# Patient Record
Sex: MALE | Race: WHITE | NOT HISPANIC OR LATINO | Employment: UNEMPLOYED | URBAN - METROPOLITAN AREA
[De-identification: names, ages, dates, MRNs, and addresses within clinical notes are randomized per-mention and may not be internally consistent; named-entity substitution may affect disease eponyms.]

---

## 2019-10-24 ENCOUNTER — OFFICE VISIT (OUTPATIENT)
Dept: FAMILY MEDICINE | Facility: CLINIC | Age: 64
End: 2019-10-24

## 2019-10-24 VITALS
WEIGHT: 168 LBS | HEIGHT: 69 IN | BODY MASS INDEX: 24.88 KG/M2 | OXYGEN SATURATION: 98 % | HEART RATE: 59 BPM | DIASTOLIC BLOOD PRESSURE: 80 MMHG | SYSTOLIC BLOOD PRESSURE: 150 MMHG

## 2019-10-24 DIAGNOSIS — R41.3 MEMORY CHANGES: ICD-10-CM

## 2019-10-24 DIAGNOSIS — E78.5 HYPERLIPIDEMIA, UNSPECIFIED HYPERLIPIDEMIA TYPE: ICD-10-CM

## 2019-10-24 DIAGNOSIS — F17.200 SMOKING: ICD-10-CM

## 2019-10-24 DIAGNOSIS — I10 HYPERTENSION, UNSPECIFIED TYPE: ICD-10-CM

## 2019-10-24 DIAGNOSIS — Z76.89 ENCOUNTER TO ESTABLISH CARE: Primary | ICD-10-CM

## 2019-10-24 DIAGNOSIS — K21.9 GASTROESOPHAGEAL REFLUX DISEASE, ESOPHAGITIS PRESENCE NOT SPECIFIED: ICD-10-CM

## 2019-10-24 DIAGNOSIS — I63.9 ISCHEMIC STROKE: ICD-10-CM

## 2019-10-24 DIAGNOSIS — R53.1 RIGHT SIDED WEAKNESS: ICD-10-CM

## 2019-10-24 PROCEDURE — 99205 OFFICE O/P NEW HI 60 MIN: CPT | Mod: S$GLB,,, | Performed by: NURSE PRACTITIONER

## 2019-10-24 PROCEDURE — 99205 PR OFFICE/OUTPT VISIT, NEW, LEVL V, 60-74 MIN: ICD-10-PCS | Mod: S$GLB,,, | Performed by: NURSE PRACTITIONER

## 2019-10-24 RX ORDER — ATORVASTATIN CALCIUM 80 MG/1
80 TABLET, FILM COATED ORAL NIGHTLY
Qty: 30 TABLET | Refills: 2 | Status: SHIPPED | OUTPATIENT
Start: 2019-10-24 | End: 2020-01-23 | Stop reason: SDUPTHER

## 2019-10-24 RX ORDER — CLOPIDOGREL BISULFATE 75 MG/1
75 TABLET ORAL DAILY
COMMUNITY
End: 2019-10-24 | Stop reason: SDUPTHER

## 2019-10-24 RX ORDER — ATORVASTATIN CALCIUM 80 MG/1
80 TABLET, FILM COATED ORAL DAILY
COMMUNITY
End: 2019-10-24 | Stop reason: SDUPTHER

## 2019-10-24 RX ORDER — OMEPRAZOLE 20 MG/1
20 CAPSULE, DELAYED RELEASE ORAL DAILY
COMMUNITY
End: 2020-03-06

## 2019-10-24 RX ORDER — MELOXICAM 15 MG/1
15 TABLET ORAL DAILY
COMMUNITY
End: 2020-03-06 | Stop reason: SDUPTHER

## 2019-10-24 RX ORDER — ASPIRIN 81 MG/1
81 TABLET ORAL DAILY
COMMUNITY

## 2019-10-24 RX ORDER — VALSARTAN 40 MG/1
40 TABLET ORAL DAILY
Qty: 30 TABLET | Refills: 2 | Status: SHIPPED | OUTPATIENT
Start: 2019-10-24 | End: 2020-01-23 | Stop reason: SDUPTHER

## 2019-10-24 RX ORDER — CLOPIDOGREL BISULFATE 75 MG/1
75 TABLET ORAL DAILY
Qty: 30 TABLET | Refills: 2 | Status: SHIPPED | OUTPATIENT
Start: 2019-10-24 | End: 2020-01-23 | Stop reason: SDUPTHER

## 2019-10-24 NOTE — PROGRESS NOTES
Clinic Note  10/24/2019      Subjective:       Patient ID:  Jose is a 64 y.o. male being seen in office today to establish care.       Chief Complaint: Establish Care and Hypertension (Pt went to see a Dr for his disability and his BP at the visit was 198/102-on last Saturday. Kept a headache all weekend and it went away on this Tues. Pt's wife monitored BP over the weekend and she recorded high readings. )    New patient here to establish care with wife at side. Wife is the primary historian during the visit. Reports had ischemic stroke in June of this year while working in [place]. Has since been unable to work and lost insurance. Currently trying to get disability until Medicare begins in January of 2020. Out of Plavix and Lipitor prescribed during hospital admission for ischemic CVA. Also reports elevated B/P at home of 198/102 with headaches. Elevated in office today as well. Denies ever taking any previous B/P medication. Wife reports right sided deficits after CVA. Cannot hold utensil in right hand and has trouble maintaining a steady balance when walking due to right leg weakness. Denies difficulty swallowing but wife states she has been cutting up his food for him. Wife also states his memory is not as sharp and he forgets what he is doing at times since the CVA.     Current smoker: Continues to smoke. Had taken Chantix in the past and was successful with cessation but can no longer afford the RX since losing insurance coverage. Used OTC patches in the past with no success.     GERD:  well controlled on OTC Omeprazole.     Prevention: Reports colonoscopy about 5 years ago; denies any problems at the time. Would like to hold off on all preventive vaccines and testing until insurance coverage is established.     Family History   Problem Relation Age of Onset    Diabetes Mother     Hypertension Mother     Cancer Father     Aneurysm Father     Diabetes Brother      Social History     Socioeconomic History     Marital status:      Spouse name: Not on file    Number of children: Not on file    Years of education: Not on file    Highest education level: Not on file   Occupational History    Not on file   Social Needs    Financial resource strain: Not on file    Food insecurity:     Worry: Not on file     Inability: Not on file    Transportation needs:     Medical: Not on file     Non-medical: Not on file   Tobacco Use    Smoking status: Current Every Day Smoker    Smokeless tobacco: Never Used   Substance and Sexual Activity    Alcohol use: Never     Frequency: Never    Drug use: Never    Sexual activity: Not on file   Lifestyle    Physical activity:     Days per week: Not on file     Minutes per session: Not on file    Stress: Not on file   Relationships    Social connections:     Talks on phone: Not on file     Gets together: Not on file     Attends [employer] service: Not on file     Active member of club or organization: Not on file     Attends meetings of clubs or organizations: Not on file     Relationship status: Not on file   Other Topics Concern    Not on file   Social History Narrative    Not on file     History reviewed. No pertinent surgical history.  Social History     Substance and Sexual Activity   Alcohol Use Never    Frequency: Never     Patient has no known allergies.  Medication List with Changes/Refills   New Medications    VALSARTAN (DIOVAN) 40 MG TABLET    Take 1 tablet (40 mg total) by mouth once daily.   Current Medications    ASPIRIN (ECOTRIN) 81 MG EC TABLET    Take 81 mg by mouth once daily.    MELOXICAM (MOBIC) 15 MG TABLET    Take 15 mg by mouth once daily.    OMEPRAZOLE (PRILOSEC) 20 MG CAPSULE    Take 20 mg by mouth once daily.   Changed and/or Refilled Medications    Modified Medication Previous Medication    ATORVASTATIN (LIPITOR) 80 MG TABLET atorvastatin (LIPITOR) 80 MG tablet       Take 1 tablet (80 mg total) by mouth every evening.    Take 80 mg by mouth once  "daily.    CLOPIDOGREL (PLAVIX) 75 MG TABLET clopidogrel (PLAVIX) 75 mg tablet       Take 1 tablet (75 mg total) by mouth once daily.    Take 75 mg by mouth once daily.       Review of Systems   Constitutional: Negative for chills, fever and weight loss.   HENT: Negative for congestion, sinus pain and sore throat.    Eyes: Negative for photophobia.   Respiratory: Negative for cough, shortness of breath and wheezing.    Cardiovascular: Negative for chest pain, palpitations and leg swelling.   Gastrointestinal: Negative for abdominal pain, blood in stool, constipation, diarrhea, heartburn, nausea and vomiting.   Genitourinary: Negative for frequency and urgency.   Musculoskeletal: Negative for falls, joint pain and neck pain.   Skin: Negative for rash.   Neurological: Positive for weakness and headaches. Negative for dizziness, speech change, seizures and loss of consciousness.   Psychiatric/Behavioral: Positive for memory loss. Negative for depression and suicidal ideas.             BP (!) 150/80 (BP Location: Left arm, Patient Position: Sitting, BP Method: Large (Manual))   Pulse (!) 59   Ht 5' 9" (1.753 m)   Wt 76.2 kg (168 lb)   SpO2 98%   BMI 24.81 kg/m²   Estimated body mass index is 24.81 kg/m² as calculated from the following:    Height as of this encounter: 5' 9" (1.753 m).    Weight as of this encounter: 76.2 kg (168 lb).    Objective:        Physical Exam   Constitutional: He is oriented to person, place, and time and well-developed, well-nourished, and in no distress.   HENT:   Head: Normocephalic and atraumatic.   Eyes: Pupils are equal, round, and reactive to light. Conjunctivae and EOM are normal.   Neck: Normal range of motion and full passive range of motion without pain. Neck supple. No JVD present. Carotid bruit is not present.   Cardiovascular: Normal rate, regular rhythm and intact distal pulses. Exam reveals no gallop and no friction rub.   No murmur heard.  Pulmonary/Chest: Effort normal and " breath sounds normal. No respiratory distress. He has no wheezes.   Abdominal: Soft. Bowel sounds are normal. He exhibits no distension and no abdominal bruit. There is no tenderness.   Musculoskeletal: Normal range of motion. He exhibits no edema, tenderness or deformity.   Neurological: He is alert and oriented to person, place, and time. He has normal reflexes and intact cranial nerves. He is not disoriented. He displays weakness (Right sided weakness). He displays facial symmetry and normal speech. No cranial nerve deficit or sensory deficit. Gait (Off balance when walking from chair to exam table) abnormal.   Speech clear, thought process coherent and memory intact today.  3/5 strength against resistance to right upper and lower extremity.   5/5 strength against resistance to left upper and lower extremity.     Skin: Skin is warm and dry. No rash noted. No erythema.   Psychiatric: Mood, memory, affect and judgment normal.   Nursing note and vitals reviewed.        Assessment and Plan:         Jose was seen today for establish care and hypertension.    Diagnoses and all orders for this visit:    Encounter to establish care  -     Comprehensive metabolic panel; Future  -     CBC auto differential; Future  -     Lipid panel; Future  -     PSA, Screening; Future  -     Hemoglobin A1c; Future  -     Comprehensive metabolic panel  -     CBC auto differential  -     Lipid panel  -     PSA, Screening  -     Hemoglobin A1c    Hypertension, unspecified type  -     valsartan (DIOVAN) 40 MG tablet; Take 1 tablet (40 mg total) by mouth once daily.    Hyperlipidemia, unspecified hyperlipidemia type  -     atorvastatin (LIPITOR) 80 MG tablet; Take 1 tablet (80 mg total) by mouth every evening.    Ischemic stroke  Comments:  Continue Aspirin 81mg oral once daily  Orders:  -     clopidogrel (PLAVIX) 75 mg tablet; Take 1 tablet (75 mg total) by mouth once daily.    Smoking  Comments:  Counseled about smoking cessation and  educated on risks of CAD, PVD, CVA. Unwilling to quit at this time. Cannot afford Chantix at this time.     Gastroesophageal reflux disease, esophagitis presence not specified  Comments:  Well controlled on current Omeprazole dose.     Right sided weakness  Comments:  Instructed not to drive. Recommend PT, OT. Patient and wife states they cannot afford; will revisit once insurance re-established.     Memory changes  Comments:  Instructed not to drive. Recommend PT, OT. Patient and wife states they cannot afford; will revisit once insurance re-established.     Pt and wife agreed to proceed with lab work even though they will pay out of pocket. Good RX card given for prescriptions.    Report to ER with markedly elevated BP, CP, SOB, dsypena, HA, visual disturbances, changes in mental status.  Monitor BP daily; keep log and bring to follow-up    Follow up:   Follow up in about 2 weeks (around 11/7/2019).            Jennifer Mott NP

## 2020-01-23 DIAGNOSIS — I63.9 ISCHEMIC STROKE: ICD-10-CM

## 2020-01-23 DIAGNOSIS — E78.5 HYPERLIPIDEMIA, UNSPECIFIED HYPERLIPIDEMIA TYPE: ICD-10-CM

## 2020-01-23 DIAGNOSIS — I10 HYPERTENSION, UNSPECIFIED TYPE: ICD-10-CM

## 2020-01-24 RX ORDER — VALSARTAN 40 MG/1
40 TABLET ORAL DAILY
Qty: 30 TABLET | Refills: 2 | Status: SHIPPED | OUTPATIENT
Start: 2020-01-24 | End: 2020-03-06 | Stop reason: SDUPTHER

## 2020-01-24 RX ORDER — ATORVASTATIN CALCIUM 80 MG/1
80 TABLET, FILM COATED ORAL NIGHTLY
Qty: 30 TABLET | Refills: 2 | Status: SHIPPED | OUTPATIENT
Start: 2020-01-24 | End: 2020-04-17 | Stop reason: SDUPTHER

## 2020-01-24 RX ORDER — CLOPIDOGREL BISULFATE 75 MG/1
75 TABLET ORAL DAILY
Qty: 30 TABLET | Refills: 2 | Status: SHIPPED | OUTPATIENT
Start: 2020-01-24 | End: 2020-04-17 | Stop reason: SDUPTHER

## 2020-03-06 ENCOUNTER — OFFICE VISIT (OUTPATIENT)
Dept: FAMILY MEDICINE | Facility: CLINIC | Age: 65
End: 2020-03-06
Payer: MEDICARE

## 2020-03-06 VITALS
RESPIRATION RATE: 16 BRPM | TEMPERATURE: 98 F | OXYGEN SATURATION: 99 % | HEIGHT: 69 IN | HEART RATE: 56 BPM | SYSTOLIC BLOOD PRESSURE: 160 MMHG | BODY MASS INDEX: 24.88 KG/M2 | DIASTOLIC BLOOD PRESSURE: 80 MMHG | WEIGHT: 168 LBS

## 2020-03-06 DIAGNOSIS — Z11.59 ENCOUNTER FOR HEPATITIS C SCREENING TEST FOR LOW RISK PATIENT: ICD-10-CM

## 2020-03-06 DIAGNOSIS — Z79.899 LONG TERM CURRENT USE OF THERAPEUTIC DRUG: ICD-10-CM

## 2020-03-06 DIAGNOSIS — K21.9 GASTROESOPHAGEAL REFLUX DISEASE, ESOPHAGITIS PRESENCE NOT SPECIFIED: ICD-10-CM

## 2020-03-06 DIAGNOSIS — Z13.6 ENCOUNTER FOR ABDOMINAL AORTIC ANEURYSM (AAA) SCREENING: ICD-10-CM

## 2020-03-06 DIAGNOSIS — F17.200 READY TO QUIT SMOKING: ICD-10-CM

## 2020-03-06 DIAGNOSIS — I63.9 ISCHEMIC STROKE: ICD-10-CM

## 2020-03-06 DIAGNOSIS — Z12.5 SCREENING FOR PROSTATE CANCER: ICD-10-CM

## 2020-03-06 DIAGNOSIS — M19.071 ARTHRITIS OF RIGHT ANKLE: ICD-10-CM

## 2020-03-06 DIAGNOSIS — I10 HYPERTENSION, UNSPECIFIED TYPE: Primary | ICD-10-CM

## 2020-03-06 DIAGNOSIS — E78.5 HYPERLIPIDEMIA, UNSPECIFIED HYPERLIPIDEMIA TYPE: ICD-10-CM

## 2020-03-06 DIAGNOSIS — F17.200 CURRENT EVERY DAY SMOKER: ICD-10-CM

## 2020-03-06 PROBLEM — Z76.89 ENCOUNTER TO ESTABLISH CARE: Status: RESOLVED | Noted: 2019-10-24 | Resolved: 2020-03-06

## 2020-03-06 PROBLEM — R41.3 MEMORY CHANGES: Status: RESOLVED | Noted: 2019-10-24 | Resolved: 2020-03-06

## 2020-03-06 PROBLEM — R53.1 RIGHT SIDED WEAKNESS: Status: RESOLVED | Noted: 2019-10-24 | Resolved: 2020-03-06

## 2020-03-06 PROCEDURE — 99214 PR OFFICE/OUTPT VISIT, EST, LEVL IV, 30-39 MIN: ICD-10-PCS | Mod: S$GLB,,, | Performed by: NURSE PRACTITIONER

## 2020-03-06 PROCEDURE — 99214 OFFICE O/P EST MOD 30 MIN: CPT | Mod: S$GLB,,, | Performed by: NURSE PRACTITIONER

## 2020-03-06 RX ORDER — MELOXICAM 15 MG/1
15 TABLET ORAL DAILY
Qty: 90 TABLET | Refills: 1 | Status: SHIPPED | OUTPATIENT
Start: 2020-03-06 | End: 2020-09-02

## 2020-03-06 RX ORDER — VALSARTAN 40 MG/1
80 TABLET ORAL DAILY
Qty: 180 TABLET | Refills: 1 | Status: SHIPPED | OUTPATIENT
Start: 2020-03-06 | End: 2020-04-27

## 2020-03-06 RX ORDER — VARENICLINE TARTRATE 0.5 (11)-1
KIT ORAL
Qty: 1 PACKAGE | Refills: 0 | Status: SHIPPED | OUTPATIENT
Start: 2020-03-06 | End: 2020-07-14

## 2020-03-06 RX ORDER — OMEPRAZOLE 40 MG/1
40 CAPSULE, DELAYED RELEASE ORAL DAILY
Qty: 90 CAPSULE | Refills: 1 | Status: SHIPPED | OUTPATIENT
Start: 2020-03-06 | End: 2020-06-02 | Stop reason: ALTCHOICE

## 2020-03-06 NOTE — PROGRESS NOTES
Clinic Note  3/6/2020      Subjective:       Patient ID:  Jose is a 65 y.o. male being seen in office today as an established patient.     Chief Complaint: Follow-up (Pt is requesting a refill of Mobic and Chantix to quit smoking. Also lab orders if needed.)    Mr. Rosales is here today to follow-up. At his last visit in October of 2019 he was without insurance and was self-pay. He has since turned 65 and is on Medicare. He has a hx of CVA, HTN, hyperlipidemia, arthritis, and GERD.     CVA-ischemic stroke while working in [place] in June of 2019. At last visit he had significant right sided weakness and deficit. He reports he has regained much of his strength since our last encounter and balance has improved.     HTN-Valsartan 40mg initiated at his last visit for elevated B/P, reports compliance. Elevated in office today at 160/80. Reports checking at home and runs in 150's over 80's. Denies CP, SOB, headaches, palpitations.    Hyperlipidemia-compliant with Lipitor 80mg. Was unable to have lipids or any labs done at last visit due to lack of insurance.    Arthritis-right ankle arthritis that is well controlled on Mobic 15mg    GERD-currently taking Omeprazole 20mg OTC 3x daily per another providers recommendation. Acid reflux well controlled on this regimen.     Current smoker-pt is interested in smoking cessation aid at today's visit. He has used Chantix in the past with success, but was unable to afford to keep filling RX before Medicare.     Prevention-Reports colonoscopy about 5 years ago; denies any problems at the time. Refuses Flu/pneumo vaccine at today's visit.     Family History   Problem Relation Age of Onset    Diabetes Mother     Hypertension Mother     Cancer Father     Aneurysm Father     Diabetes Brother      History reviewed. No pertinent surgical history.  Social History     Substance and Sexual Activity   Alcohol Use Never    Frequency: Never     Patient has no known allergies.  Medication List  with Changes/Refills   New Medications    OMEPRAZOLE (PRILOSEC) 40 MG CAPSULE    Take 1 capsule (40 mg total) by mouth once daily.    VARENICLINE (CHANTIX STARTING MONTH BOX) 0.5 MG (11)- 1 MG (42) TABLET    Take one 0.5mg tab by mouth once daily X3 days,then increase to one 0.5mg tab twice daily X4 days,then increase to one 1mg tab twice daily   Current Medications    ASPIRIN (ECOTRIN) 81 MG EC TABLET    Take 81 mg by mouth once daily.    ATORVASTATIN (LIPITOR) 80 MG TABLET    Take 1 tablet (80 mg total) by mouth every evening.    CLOPIDOGREL (PLAVIX) 75 MG TABLET    Take 1 tablet (75 mg total) by mouth once daily.   Changed and/or Refilled Medications    Modified Medication Previous Medication    MELOXICAM (MOBIC) 15 MG TABLET meloxicam (MOBIC) 15 MG tablet       Take 1 tablet (15 mg total) by mouth once daily.    Take 15 mg by mouth once daily.    VALSARTAN (DIOVAN) 40 MG TABLET valsartan (DIOVAN) 40 MG tablet       Take 2 tablets (80 mg total) by mouth once daily.    Take 1 tablet (40 mg total) by mouth once daily.   Discontinued Medications    OMEPRAZOLE (PRILOSEC) 20 MG CAPSULE    Take 20 mg by mouth once daily.       Review of Systems   Constitutional: Negative for appetite change, diaphoresis, fatigue and fever.   HENT: Negative for congestion, ear pain, postnasal drip, rhinorrhea, sinus pressure, sinus pain and sore throat.    Respiratory: Negative for cough, shortness of breath, wheezing and stridor.    Cardiovascular: Negative for chest pain, palpitations and leg swelling.   Gastrointestinal: Negative for abdominal pain, blood in stool, constipation, diarrhea, nausea and vomiting.   Genitourinary: Negative for flank pain, frequency, hematuria and urgency.   Musculoskeletal: Positive for arthralgias. Negative for gait problem, joint swelling and myalgias.   Skin: Negative for color change and rash.   Neurological: Negative for dizziness, tremors, seizures, speech difficulty, weakness, numbness and  "headaches.   Psychiatric/Behavioral: Negative for confusion, decreased concentration, hallucinations and sleep disturbance. The patient is not nervous/anxious.               BP (!) 160/80 (BP Location: Left arm, Patient Position: Sitting, BP Method: Large (Manual))   Pulse (!) 56   Temp 98.2 °F (36.8 °C) (Temporal)   Resp 16   Ht 5' 9" (1.753 m)   Wt 76.2 kg (168 lb)   SpO2 99%   BMI 24.81 kg/m²   Estimated body mass index is 24.81 kg/m² as calculated from the following:    Height as of this encounter: 5' 9" (1.753 m).    Weight as of this encounter: 76.2 kg (168 lb).    Objective:        Physical Exam   Constitutional: He is oriented to person, place, and time. He appears well-developed and well-nourished.   HENT:   Head: Normocephalic and atraumatic.   Right Ear: Tympanic membrane normal.   Left Ear: Tympanic membrane normal.   Nose: Nose normal.   Mouth/Throat: Uvula is midline, oropharynx is clear and moist and mucous membranes are normal. No oropharyngeal exudate, posterior oropharyngeal edema or posterior oropharyngeal erythema.   Eyes: Pupils are equal, round, and reactive to light. Conjunctivae and EOM are normal.   Neck: Trachea normal, normal range of motion and full passive range of motion without pain. Neck supple. No JVD present. Carotid bruit is not present. No thyroid mass and no thyromegaly present.   Cardiovascular: Normal rate, regular rhythm and intact distal pulses. Exam reveals no gallop and no friction rub.   No murmur heard.  Pulmonary/Chest: Effort normal and breath sounds normal. No stridor. No respiratory distress. He has no wheezes. He has no rhonchi. He has no rales.   Abdominal: Soft. Bowel sounds are normal. He exhibits no distension, no abdominal bruit and no mass. There is no tenderness. There is no CVA tenderness.   Musculoskeletal: Normal range of motion.   5/5 strength against resistance to right upper and lower extremity.  5/5 strength against resistance to left upper and " lower extremity.   Lymphadenopathy:     He has no cervical adenopathy.   Neurological: He is alert and oriented to person, place, and time. He has normal strength. No cranial nerve deficit or sensory deficit.   Skin: Skin is warm, dry and intact. Capillary refill takes less than 2 seconds. No rash noted.   Psychiatric: He has a normal mood and affect. His speech is normal and behavior is normal. Judgment and thought content normal. His mood appears not anxious. Cognition and memory are normal. He does not exhibit a depressed mood. He expresses no suicidal ideation. He expresses no suicidal plans and no homicidal plans.   Nursing note and vitals reviewed.         Assessment and Plan:         Jose was seen today for follow-up.    Diagnoses and all orders for this visit:    Hypertension, unspecified type  -     valsartan (DIOVAN) 40 MG tablet; Take 2 tablets (80 mg total) by mouth once daily.  -     Comprehensive metabolic panel; Future  -     CBC auto differential; Future  -     TSH w/reflex to FT4; Future  -     Urinalysis, Reflex to Urine Culture Urine, Clean Catch; Future  -     Lipid panel; Future  -     Hemoglobin A1c; Future  -     Comprehensive metabolic panel  -     CBC auto differential  -     TSH w/reflex to FT4  -     Urinalysis, Reflex to Urine Culture Urine, Clean Catch  -     Lipid panel  -     Hemoglobin A1c  -     CV AAA Screening; Future    Hyperlipidemia, unspecified hyperlipidemia type  -     Lipid panel; Future  -     Hemoglobin A1c; Future  -     Lipid panel  -     CV AAA Screening; Future    Ischemic stroke  -     Comprehensive metabolic panel; Future  -     CBC auto differential; Future  -     TSH w/reflex to FT4; Future  -     Lipid panel; Future  -     Hemoglobin A1c; Future  -     Comprehensive metabolic panel  -     CBC auto differential  -     TSH w/reflex to FT4  -     Lipid panel  -     Hemoglobin A1c  -     CV AAA Screening; Future    Gastroesophageal reflux disease, esophagitis  presence not specified  -     omeprazole (PRILOSEC) 40 MG capsule; Take 1 capsule (40 mg total) by mouth once daily.    Current every day smoker  -     varenicline (CHANTIX STARTING MONTH BOX) 0.5 mg (11)- 1 mg (42) tablet; Take one 0.5mg tab by mouth once daily X3 days,then increase to one 0.5mg tab twice daily X4 days,then increase to one 1mg tab twice daily  -     CV AAA Screening; Future    Ready to quit smoking  -     varenicline (CHANTIX STARTING MONTH BOX) 0.5 mg (11)- 1 mg (42) tablet; Take one 0.5mg tab by mouth once daily X3 days,then increase to one 0.5mg tab twice daily X4 days,then increase to one 1mg tab twice daily    Arthritis of right ankle  -     meloxicam (MOBIC) 15 MG tablet; Take 1 tablet (15 mg total) by mouth once daily.    Encounter for abdominal aortic aneurysm (AAA) screening  -     CV AAA Screening; Future    Screening for prostate cancer  -     PSA, Screening; Future  -     PSA, Screening    Encounter for hepatitis C screening test for low risk patient  -     Hepatitis C antibody; Future  -     Hepatitis C antibody    Long term current use of therapeutic drug  -     Comprehensive metabolic panel; Future  -     CBC auto differential; Future  -     TSH w/reflex to FT4; Future  -     Urinalysis, Reflex to Urine Culture Urine, Clean Catch; Future  -     Lipid panel; Future  -     Hemoglobin A1c; Future  -     Comprehensive metabolic panel  -     CBC auto differential  -     TSH w/reflex to FT4  -     Urinalysis, Reflex to Urine Culture Urine, Clean Catch  -     Lipid panel  -     Hemoglobin A1c    Pt's right-sided deficit and weakness has resolved since our last encounter. Gait and balance improved, he is now driving again. Will have labs drawn at [facility] in [place]; instructed pt to call if he has not heard from us 1 week after having drawn to ensure we received results.  Increase Valsartan to 80mg and monitor B/P daily and bring log to follow-up. Report to ER with markedly elevated BP, CP,  SOB, dsypena, HA, visual disturbances. Encouraged to increase physical activity: 40 min of aerobic physical activity 3-4 x per week. Follow low-fat/low-cholesterol diet  GERD: advised pt not to take omeprazole 20mg TID. RX for 40mg QD sent.   Will revisit vaccine administration at next visit.  Patient verbalized understanding and agreed to treatment and plan of care.      Follow up:   Follow up in about 2 weeks (around 3/20/2020) for B/P and lab results.            Jennifer Mott, NP

## 2020-03-09 DIAGNOSIS — F17.200 READY TO QUIT SMOKING: ICD-10-CM

## 2020-03-09 DIAGNOSIS — F17.200 SMOKING: Primary | ICD-10-CM

## 2020-03-09 RX ORDER — BUPROPION HYDROCHLORIDE 150 MG/1
150 TABLET, EXTENDED RELEASE ORAL 2 TIMES DAILY
Qty: 60 TABLET | Refills: 2 | Status: SHIPPED | OUTPATIENT
Start: 2020-03-09 | End: 2020-04-27 | Stop reason: SDUPTHER

## 2020-04-17 DIAGNOSIS — E78.5 HYPERLIPIDEMIA, UNSPECIFIED HYPERLIPIDEMIA TYPE: ICD-10-CM

## 2020-04-17 DIAGNOSIS — I63.9 ISCHEMIC STROKE: ICD-10-CM

## 2020-04-17 RX ORDER — ATORVASTATIN CALCIUM 80 MG/1
80 TABLET, FILM COATED ORAL NIGHTLY
Qty: 30 TABLET | Refills: 2 | Status: SHIPPED | OUTPATIENT
Start: 2020-04-17 | End: 2020-08-04 | Stop reason: SDUPTHER

## 2020-04-17 RX ORDER — CLOPIDOGREL BISULFATE 75 MG/1
75 TABLET ORAL DAILY
Qty: 30 TABLET | Refills: 2 | Status: SHIPPED | OUTPATIENT
Start: 2020-04-17 | End: 2020-05-26 | Stop reason: SDUPTHER

## 2020-04-23 ENCOUNTER — TELEPHONE (OUTPATIENT)
Dept: FAMILY MEDICINE | Facility: CLINIC | Age: 65
End: 2020-04-23

## 2020-04-23 NOTE — TELEPHONE ENCOUNTER
----- Message from Saman De La O sent at 4/23/2020  3:26 PM CDT -----  Contact: Pt  Please call Jose to discuss his test results [phone].

## 2020-04-27 ENCOUNTER — OFFICE VISIT (OUTPATIENT)
Dept: FAMILY MEDICINE | Facility: CLINIC | Age: 65
End: 2020-04-27
Payer: MEDICARE

## 2020-04-27 DIAGNOSIS — E83.51 HYPOCALCEMIA: ICD-10-CM

## 2020-04-27 DIAGNOSIS — I10 ESSENTIAL HYPERTENSION: Primary | ICD-10-CM

## 2020-04-27 DIAGNOSIS — F17.200 READY TO QUIT SMOKING: ICD-10-CM

## 2020-04-27 DIAGNOSIS — K21.9 GASTROESOPHAGEAL REFLUX DISEASE, ESOPHAGITIS PRESENCE NOT SPECIFIED: ICD-10-CM

## 2020-04-27 DIAGNOSIS — E78.5 HYPERLIPIDEMIA, UNSPECIFIED HYPERLIPIDEMIA TYPE: ICD-10-CM

## 2020-04-27 DIAGNOSIS — F17.200 SMOKING: ICD-10-CM

## 2020-04-27 PROCEDURE — 99213 OFFICE O/P EST LOW 20 MIN: CPT | Mod: 95,,, | Performed by: NURSE PRACTITIONER

## 2020-04-27 PROCEDURE — 99213 PR OFFICE/OUTPT VISIT, EST, LEVL III, 20-29 MIN: ICD-10-PCS | Mod: 95,,, | Performed by: NURSE PRACTITIONER

## 2020-04-27 RX ORDER — VALSARTAN 320 MG/1
160 TABLET ORAL DAILY
Qty: 45 TABLET | Refills: 1 | Status: SHIPPED | OUTPATIENT
Start: 2020-04-27 | End: 2020-05-06

## 2020-04-27 RX ORDER — BUPROPION HYDROCHLORIDE 150 MG/1
150 TABLET, EXTENDED RELEASE ORAL 2 TIMES DAILY
Qty: 60 TABLET | Refills: 2 | Status: SHIPPED | OUTPATIENT
Start: 2020-04-27 | End: 2020-07-26

## 2020-04-27 NOTE — PROGRESS NOTES
"Clinic Note  4/27/2020      Subjective:       Patient ID:  Jose is a 65 y.o. male being seen in office today as an established patient.     Chief Complaint: Follow-up (Review Labs and HTN)    The patient location is: home  The chief complaint leading to consultation is: follow-up to review labs and HTN  Visit type: audiovisual  Total time spent with patient: 15 minutes  Each patient to whom he or she provides medical services by telemedicine is:  (1) informed of the relationship between the physician and patient and the respective role of any other health care provider with respect to management of the patient; and (2) notified that he or she may decline to receive medical services by telemedicine and may withdraw from such care at any time.    Notes:     HPI    Mr. Rosales is being seen today via virtual visit with wife at side to follow-up regarding HTN and to review recent lab work. He has a hx of CVA, HTN, hyperlipidemia, arthritis, and GERD. All recent labs reviewed with patient and wife at this encounter.    HTN-Valsartan increased to 80mg at his last visit for elevated B/P with readings of 160/80. His wife reports checking at home and is still running as high as 150's over 80's with lowest 130/80. Denies CP, SOB, headaches, palpitations.    Hyperlipidemia- Chol 110, Trig 52, HDL 37.6, LDL 62. Compliant with Lipitor 80mg daily.    Current smoker-Chantix was not covered by pt's insurance. Trial of Wellbutrin to aid in cessation. Wife reports less irritable and novak but has not helped him cut back on smoking. Has tried patches before with no luck. Has not tried nicotine gum.     GERD-compliant with Prilosec but states "take the 40mg then have to take another 20mg later in the day". Wife states he had an upper GI in the early 80's but has not been seen for since. Certain foods do "trigger" reflux.     Urinalysis, A1C, CBC, TSH, PSA reviewed with pt-all unremarkable. Hep C antibody negative. Calcium low at " 8.75        Family History   Problem Relation Age of Onset    Diabetes Mother     Hypertension Mother     Cancer Father     Aneurysm Father     Diabetes Brother      No past surgical history on file.  Social History     Substance and Sexual Activity   Alcohol Use Never    Frequency: Never     Patient has no known allergies.  Medication List with Changes/Refills   New Medications    VALSARTAN (DIOVAN) 320 MG TABLET    Take 0.5 tablets (160 mg total) by mouth once daily.   Current Medications    ASPIRIN (ECOTRIN) 81 MG EC TABLET    Take 81 mg by mouth once daily.    ATORVASTATIN (LIPITOR) 80 MG TABLET    Take 1 tablet (80 mg total) by mouth every evening.    CLOPIDOGREL (PLAVIX) 75 MG TABLET    Take 1 tablet (75 mg total) by mouth once daily.    MELOXICAM (MOBIC) 15 MG TABLET    Take 1 tablet (15 mg total) by mouth once daily.    OMEPRAZOLE (PRILOSEC) 40 MG CAPSULE    Take 1 capsule (40 mg total) by mouth once daily.    VARENICLINE (CHANTIX STARTING MONTH BOX) 0.5 MG (11)- 1 MG (42) TABLET    Take one 0.5mg tab by mouth once daily X3 days,then increase to one 0.5mg tab twice daily X4 days,then increase to one 1mg tab twice daily   Changed and/or Refilled Medications    Modified Medication Previous Medication    BUPROPION (WELLBUTRIN SR) 150 MG TBSR 12 HR TABLET buPROPion (WELLBUTRIN SR) 150 MG TBSR 12 hr tablet       Take 1 tablet (150 mg total) by mouth 2 (two) times daily.    Take 1 tablet (150 mg total) by mouth 2 (two) times daily.   Discontinued Medications    VALSARTAN (DIOVAN) 40 MG TABLET    Take 2 tablets (80 mg total) by mouth once daily.       Review of Systems   Constitutional: Negative for appetite change, diaphoresis, fatigue and fever.   HENT: Negative for congestion, ear pain, postnasal drip, rhinorrhea, sinus pressure, sinus pain and sore throat.    Respiratory: Negative for cough, shortness of breath, wheezing and stridor.    Cardiovascular: Negative for chest pain, palpitations and leg  "swelling.   Gastrointestinal: Negative for abdominal pain, blood in stool, constipation, diarrhea, nausea and vomiting.   Genitourinary: Negative for flank pain, frequency, hematuria and urgency.   Musculoskeletal: Negative for gait problem, joint swelling and myalgias.   Skin: Negative for color change and rash.   Neurological: Negative for dizziness, tremors, seizures, speech difficulty, weakness, numbness and headaches.   Psychiatric/Behavioral: Negative for confusion, decreased concentration, hallucinations and sleep disturbance. The patient is not nervous/anxious.               There were no vitals taken for this visit.  Estimated body mass index is 24.81 kg/m² as calculated from the following:    Height as of 3/6/20: 5' 9" (1.753 m).    Weight as of 3/6/20: 76.2 kg (168 lb).    Objective:        Physical Exam   Constitutional: He is oriented to person, place, and time. He appears well-developed and well-nourished.   Neurological: He is alert and oriented to person, place, and time.   Psychiatric: He has a normal mood and affect. His speech is normal and behavior is normal. Judgment and thought content normal. Cognition and memory are normal.          Assessment and Plan:         Jose was seen today for follow-up.    Diagnoses and all orders for this visit:    Essential hypertension  -     valsartan (DIOVAN) 320 MG tablet; Take 0.5 tablets (160 mg total) by mouth once daily.    Gastroesophageal reflux disease, esophagitis presence not specified  Comments:  Continue Prilosec. Discussed referral to GI when back from [place]    Hyperlipidemia, unspecified hyperlipidemia type  Comments:  Continue Lipitor 80mg    Hypocalcemia  Comments:  Begin OTC Calcium supplement. Will recheck at next visit    Smoking  -     buPROPion (WELLBUTRIN SR) 150 MG TBSR 12 hr tablet; Take 1 tablet (150 mg total) by mouth 2 (two) times daily.    Ready to quit smoking  Comments:  Trial of nicorette gum  Orders:  -     buPROPion (WELLBUTRIN " SR) 150 MG TBSR 12 hr tablet; Take 1 tablet (150 mg total) by mouth 2 (two) times daily.    HTN: Increase Valsartan to 160mg and monitor B/P daily and bring log to follow-up. Report to ER with markedly elevated BP, CP, SOB, dsypena, HA, visual disturbances. Encouraged to increase physical activity: 40 min of aerobic physical activity 3-4 x per week. Follow low-fat/low-cholesterol diet.    Smoking cessation: Trial of OTC nicotine gum. Set a quit date goal. Ask family/friends for support and encouragement to help you stop. Make smoking very inconvenient. Stay in nonsmoking environments and avoid friends/family who smoke. Change your routine to avoid old triggers such as smoking with your coffee or after meals. Reward yourself often for staying smoke free. It may take several attempts to finally quit for good. Chew gum or suck on hard candy when you crave a cigarette. Exercise daily to help alleviate the craving for nicotine    GERD: discussed referral to GI for possible repeat EGD. Pt states they are going to [place] for 2-3 months but is willing to go when he returns. Avoid lying down or bending over 2-3 hours after eating. Avoid alcohol. Eat a lower fat, bland diet. Eat 4-6 small meals a day instead of 3 larger meals. Avoid foods that trigger GERD     Follow up:   Follow up in about 3 months (around 7/27/2020), or sooner if needed.            Jennifer Mott NP

## 2020-05-06 ENCOUNTER — TELEPHONE (OUTPATIENT)
Dept: FAMILY MEDICINE | Facility: CLINIC | Age: 65
End: 2020-05-06

## 2020-05-06 DIAGNOSIS — I10 ESSENTIAL HYPERTENSION: ICD-10-CM

## 2020-05-06 RX ORDER — VALSARTAN 320 MG/1
320 TABLET ORAL DAILY
Qty: 30 TABLET | Refills: 5
Start: 2020-05-06 | End: 2020-06-19 | Stop reason: SDUPTHER

## 2020-05-06 NOTE — TELEPHONE ENCOUNTER
Spoke to the wife, put on new BP med and since changed it is higher.   180/93 yesterday  174/97 last night   194/107 this morning     Please call advise.

## 2020-05-06 NOTE — TELEPHONE ENCOUNTER
Called wife and spoke to her regarding Mr. Rosales B/P readings. I did clarify with her I did not change the medication, only increased the dose from 80mg to 160mg. She checked the bottle and confirmed this is correct. I instructed her to have him take a whole pill (320mg) daily starting today and record readings. Discussed adding a 2nd medication if still not controlled. She agreed to call back Friday with readings, they are currently in [place] visiting family. Will develop further plan of care when readings received.  Given ER precautions for CP, blurred vision, SOB, worse headache of live. Patient's wife verbalized understanding and agreed to treatment and plan of care.

## 2020-05-08 ENCOUNTER — TELEPHONE (OUTPATIENT)
Dept: FAMILY MEDICINE | Facility: CLINIC | Age: 65
End: 2020-05-08

## 2020-05-08 DIAGNOSIS — I10 ESSENTIAL HYPERTENSION: Primary | ICD-10-CM

## 2020-05-08 RX ORDER — AMLODIPINE BESYLATE 5 MG/1
5 TABLET ORAL DAILY
Qty: 30 TABLET | Refills: 2 | Status: SHIPPED | OUTPATIENT
Start: 2020-05-08 | End: 2020-07-08 | Stop reason: SDUPTHER

## 2020-05-08 NOTE — TELEPHONE ENCOUNTER
May 7,2020  9:30am-197/107  10:30am-180/100   10:30pm-162/93    May 8,2020  9am-159/101   11:30am-180/90    Pt. Is an hour ahead of us

## 2020-05-08 NOTE — TELEPHONE ENCOUNTER
----- Message from Kolby Padilla sent at 5/8/2020 10:36 AM CDT -----  Contact: wife-  Requesting call back regarding giving provider pt blood pressure readings. Please call back at [phone].

## 2020-05-08 NOTE — TELEPHONE ENCOUNTER
Spoke with pt's wife. Adding Amlodpine 5mg to regimen. Will continue to keep B/P log.   Report to ER with markedly elevated BP, CP, SOB, dsypena, HA, visual disturbances  Patient's wife verbalized understanding and agreed to treatment and plan of care.

## 2020-05-26 DIAGNOSIS — I63.9 ISCHEMIC STROKE: ICD-10-CM

## 2020-05-26 RX ORDER — CLOPIDOGREL BISULFATE 75 MG/1
75 TABLET ORAL DAILY
Qty: 30 TABLET | Refills: 2 | Status: SHIPPED | OUTPATIENT
Start: 2020-05-26 | End: 2020-08-24

## 2020-06-02 RX ORDER — PANTOPRAZOLE SODIUM 40 MG/1
40 TABLET, DELAYED RELEASE ORAL DAILY
Qty: 30 TABLET | Refills: 5 | Status: SHIPPED | OUTPATIENT
Start: 2020-06-02 | End: 2020-11-29

## 2020-06-19 DIAGNOSIS — I10 ESSENTIAL HYPERTENSION: ICD-10-CM

## 2020-06-19 RX ORDER — VALSARTAN 320 MG/1
320 TABLET ORAL DAILY
Qty: 30 TABLET | Refills: 5
Start: 2020-06-19 | End: 2020-06-22 | Stop reason: SDUPTHER

## 2020-06-19 RX ORDER — VALSARTAN 320 MG/1
320 TABLET ORAL DAILY
Qty: 30 TABLET | Refills: 5
Start: 2020-06-19 | End: 2020-06-19 | Stop reason: SDUPTHER

## 2020-06-22 DIAGNOSIS — I10 ESSENTIAL HYPERTENSION: ICD-10-CM

## 2020-06-22 RX ORDER — VALSARTAN 320 MG/1
320 TABLET ORAL DAILY
Qty: 30 TABLET | Refills: 5 | Status: SHIPPED | OUTPATIENT
Start: 2020-06-22 | End: 2020-08-12 | Stop reason: SDUPTHER

## 2020-07-08 DIAGNOSIS — I10 ESSENTIAL HYPERTENSION: ICD-10-CM

## 2020-07-08 RX ORDER — AMLODIPINE BESYLATE 5 MG/1
5 TABLET ORAL DAILY
Qty: 30 TABLET | Refills: 2 | Status: SHIPPED | OUTPATIENT
Start: 2020-07-08 | End: 2020-07-28 | Stop reason: SDUPTHER

## 2020-07-14 DIAGNOSIS — K21.9 GASTROESOPHAGEAL REFLUX DISEASE, ESOPHAGITIS PRESENCE NOT SPECIFIED: Primary | ICD-10-CM

## 2020-07-28 DIAGNOSIS — I10 ESSENTIAL HYPERTENSION: ICD-10-CM

## 2020-07-28 RX ORDER — AMLODIPINE BESYLATE 5 MG/1
5 TABLET ORAL DAILY
Qty: 90 TABLET | Refills: 2 | Status: SHIPPED | OUTPATIENT
Start: 2020-07-28 | End: 2021-01-24

## 2020-08-04 DIAGNOSIS — E78.5 HYPERLIPIDEMIA, UNSPECIFIED HYPERLIPIDEMIA TYPE: ICD-10-CM

## 2020-08-04 RX ORDER — ATORVASTATIN CALCIUM 80 MG/1
80 TABLET, FILM COATED ORAL NIGHTLY
Qty: 30 TABLET | Refills: 5 | Status: SHIPPED | OUTPATIENT
Start: 2020-08-04 | End: 2020-10-29 | Stop reason: SDUPTHER

## 2020-08-12 DIAGNOSIS — I10 ESSENTIAL HYPERTENSION: ICD-10-CM

## 2020-08-12 RX ORDER — VALSARTAN 320 MG/1
320 TABLET ORAL DAILY
Qty: 90 TABLET | Refills: 1 | Status: SHIPPED | OUTPATIENT
Start: 2020-08-12 | End: 2021-02-08

## 2020-10-29 DIAGNOSIS — E78.5 HYPERLIPIDEMIA, UNSPECIFIED HYPERLIPIDEMIA TYPE: ICD-10-CM

## 2020-10-29 RX ORDER — ATORVASTATIN CALCIUM 80 MG/1
80 TABLET, FILM COATED ORAL NIGHTLY
Qty: 90 TABLET | Refills: 1 | Status: SHIPPED | OUTPATIENT
Start: 2020-10-29 | End: 2021-04-27

## 2021-03-08 ENCOUNTER — PATIENT MESSAGE (OUTPATIENT)
Dept: ADMINISTRATIVE | Facility: HOSPITAL | Age: 66
End: 2021-03-08